# Patient Record
Sex: MALE | Race: WHITE
[De-identification: names, ages, dates, MRNs, and addresses within clinical notes are randomized per-mention and may not be internally consistent; named-entity substitution may affect disease eponyms.]

---

## 2020-10-30 ENCOUNTER — TRANSCRIPTION ENCOUNTER (OUTPATIENT)
Age: 65
End: 2020-10-30

## 2020-10-31 ENCOUNTER — EMERGENCY (EMERGENCY)
Facility: HOSPITAL | Age: 65
LOS: 0 days | Discharge: HOME | End: 2020-10-31
Attending: EMERGENCY MEDICINE | Admitting: EMERGENCY MEDICINE
Payer: COMMERCIAL

## 2020-10-31 VITALS
OXYGEN SATURATION: 99 % | DIASTOLIC BLOOD PRESSURE: 66 MMHG | RESPIRATION RATE: 18 BRPM | HEIGHT: 67 IN | WEIGHT: 160.06 LBS | HEART RATE: 79 BPM | SYSTOLIC BLOOD PRESSURE: 142 MMHG | TEMPERATURE: 97 F

## 2020-10-31 VITALS
SYSTOLIC BLOOD PRESSURE: 136 MMHG | RESPIRATION RATE: 18 BRPM | OXYGEN SATURATION: 99 % | DIASTOLIC BLOOD PRESSURE: 72 MMHG | TEMPERATURE: 97 F

## 2020-10-31 DIAGNOSIS — Z79.899 OTHER LONG TERM (CURRENT) DRUG THERAPY: ICD-10-CM

## 2020-10-31 DIAGNOSIS — M25.579 PAIN IN UNSPECIFIED ANKLE AND JOINTS OF UNSPECIFIED FOOT: ICD-10-CM

## 2020-10-31 DIAGNOSIS — Z88.0 ALLERGY STATUS TO PENICILLIN: ICD-10-CM

## 2020-10-31 DIAGNOSIS — E78.5 HYPERLIPIDEMIA, UNSPECIFIED: ICD-10-CM

## 2020-10-31 DIAGNOSIS — I10 ESSENTIAL (PRIMARY) HYPERTENSION: ICD-10-CM

## 2020-10-31 DIAGNOSIS — L03.115 CELLULITIS OF RIGHT LOWER LIMB: ICD-10-CM

## 2020-10-31 LAB
ALBUMIN SERPL ELPH-MCNC: 3.9 G/DL — SIGNIFICANT CHANGE UP (ref 3.5–5.2)
ALP SERPL-CCNC: 116 U/L — HIGH (ref 30–115)
ALT FLD-CCNC: 32 U/L — SIGNIFICANT CHANGE UP (ref 0–41)
ANION GAP SERPL CALC-SCNC: 11 MMOL/L — SIGNIFICANT CHANGE UP (ref 7–14)
AST SERPL-CCNC: 36 U/L — SIGNIFICANT CHANGE UP (ref 0–41)
BASOPHILS # BLD AUTO: 0.04 K/UL — SIGNIFICANT CHANGE UP (ref 0–0.2)
BASOPHILS NFR BLD AUTO: 0.5 % — SIGNIFICANT CHANGE UP (ref 0–1)
BILIRUB SERPL-MCNC: 0.7 MG/DL — SIGNIFICANT CHANGE UP (ref 0.2–1.2)
BUN SERPL-MCNC: 19 MG/DL — SIGNIFICANT CHANGE UP (ref 10–20)
CALCIUM SERPL-MCNC: 8.9 MG/DL — SIGNIFICANT CHANGE UP (ref 8.5–10.1)
CHLORIDE SERPL-SCNC: 96 MMOL/L — LOW (ref 98–110)
CO2 SERPL-SCNC: 28 MMOL/L — SIGNIFICANT CHANGE UP (ref 17–32)
CREAT SERPL-MCNC: 1.3 MG/DL — SIGNIFICANT CHANGE UP (ref 0.7–1.5)
EOSINOPHIL # BLD AUTO: 0.07 K/UL — SIGNIFICANT CHANGE UP (ref 0–0.7)
EOSINOPHIL NFR BLD AUTO: 0.8 % — SIGNIFICANT CHANGE UP (ref 0–8)
ERYTHROCYTE [SEDIMENTATION RATE] IN BLOOD: 45 MM/HR — HIGH (ref 0–10)
GLUCOSE SERPL-MCNC: 121 MG/DL — HIGH (ref 70–99)
HCT VFR BLD CALC: 46.4 % — SIGNIFICANT CHANGE UP (ref 42–52)
HGB BLD-MCNC: 15.6 G/DL — SIGNIFICANT CHANGE UP (ref 14–18)
IMM GRANULOCYTES NFR BLD AUTO: 0.4 % — HIGH (ref 0.1–0.3)
LACTATE SERPL-SCNC: 1.9 MMOL/L — SIGNIFICANT CHANGE UP (ref 0.7–2)
LYMPHOCYTES # BLD AUTO: 1.72 K/UL — SIGNIFICANT CHANGE UP (ref 1.2–3.4)
LYMPHOCYTES # BLD AUTO: 20.2 % — LOW (ref 20.5–51.1)
MCHC RBC-ENTMCNC: 29.9 PG — SIGNIFICANT CHANGE UP (ref 27–31)
MCHC RBC-ENTMCNC: 33.6 G/DL — SIGNIFICANT CHANGE UP (ref 32–37)
MCV RBC AUTO: 88.9 FL — SIGNIFICANT CHANGE UP (ref 80–94)
MONOCYTES # BLD AUTO: 1.11 K/UL — HIGH (ref 0.1–0.6)
MONOCYTES NFR BLD AUTO: 13 % — HIGH (ref 1.7–9.3)
NEUTROPHILS # BLD AUTO: 5.55 K/UL — SIGNIFICANT CHANGE UP (ref 1.4–6.5)
NEUTROPHILS NFR BLD AUTO: 65.1 % — SIGNIFICANT CHANGE UP (ref 42.2–75.2)
NRBC # BLD: 0 /100 WBCS — SIGNIFICANT CHANGE UP (ref 0–0)
PLATELET # BLD AUTO: 166 K/UL — SIGNIFICANT CHANGE UP (ref 130–400)
POTASSIUM SERPL-MCNC: 3.8 MMOL/L — SIGNIFICANT CHANGE UP (ref 3.5–5)
POTASSIUM SERPL-SCNC: 3.8 MMOL/L — SIGNIFICANT CHANGE UP (ref 3.5–5)
PROT SERPL-MCNC: 6.6 G/DL — SIGNIFICANT CHANGE UP (ref 6–8)
RBC # BLD: 5.22 M/UL — SIGNIFICANT CHANGE UP (ref 4.7–6.1)
RBC # FLD: 13 % — SIGNIFICANT CHANGE UP (ref 11.5–14.5)
SODIUM SERPL-SCNC: 135 MMOL/L — SIGNIFICANT CHANGE UP (ref 135–146)
WBC # BLD: 8.52 K/UL — SIGNIFICANT CHANGE UP (ref 4.8–10.8)
WBC # FLD AUTO: 8.52 K/UL — SIGNIFICANT CHANGE UP (ref 4.8–10.8)

## 2020-10-31 PROCEDURE — 99284 EMERGENCY DEPT VISIT MOD MDM: CPT

## 2020-10-31 PROCEDURE — 73610 X-RAY EXAM OF ANKLE: CPT | Mod: 26,RT

## 2020-10-31 RX ORDER — CEPHALEXIN 500 MG
1 CAPSULE ORAL
Qty: 40 | Refills: 0
Start: 2020-10-31 | End: 2020-11-09

## 2020-10-31 RX ORDER — CEPHALEXIN 500 MG
500 CAPSULE ORAL ONCE
Refills: 0 | Status: COMPLETED | OUTPATIENT
Start: 2020-10-31 | End: 2020-10-31

## 2020-10-31 RX ADMIN — Medication 500 MILLIGRAM(S): at 15:37

## 2020-10-31 NOTE — ED PROVIDER NOTE - CLINICAL SUMMARY MEDICAL DECISION MAKING FREE TEXT BOX
Pt with no significant PMHx presents for evaluation of redness and swelling to his right ankle. Pt states he woke up 3 days ago and noticed a small area of redness and swelling to the lateral aspect of his right ankle. The redness and swelling got progressively worse so he went to [facility] where he was prescribed and started a course of Clindamycin. He comes to the ED after he was told at [facility] to f/u for further eval. Denies fever, chills, n/v/d, numbness, weakness and tingling. On exam: Constitutional: Well appearing. No acute distress. Non toxic. Eyes: PERRLA. Extraocular movements intact, no entrapment. Conjunctiva normal. ENT: No nasal discharge. Neck: Supple, non tender, full range of motion.  Ext: Warm and well perfused x4, moving all extremities, no edema. Psy: Cooperative, appropriate. Skin: (+) warmth, swelling and erythema to lateral aspect of R ankle, dry skin, no signs of streaking. No crepitus, induration, fluctuance. Neuro: CN2-12 grossly intact, no sensory or motor deficits throughout, no drift, no ataxia. pt only started taking clinda yesterday. extensive discussion had with pt, aware that sx will get worse before improvement with <48hrs of abx. adde on keflex for staph/strep coverage. Aware of all results, given a copy of all available results, comfortable with discharge and follow-up outpatient, strict return precautions given. Endorses understanding of all of this and aware that they can return at any time for new or concerning symptoms. No further questions or concerns at this time

## 2020-10-31 NOTE — ED PROVIDER NOTE - PROGRESS NOTE DETAILS
ATTENDING NOTE: Pt with no significant PMHx presents for evaluation of redness and swelling to his right ankle. Pt states he woke up 3 days ago and noticed a small area of redness and swelling to the lateral aspect of his right ankle. The redness and swelling got progressively worse so he went to [facility] where he was prescribed and started a course of Clindamycin. He comes to the ED after he was told at [facility] to f/u for further eval. Denies fever, chills, n/v/d, numbness, weakness and tingling. On exam: Constitutional: Well appearing. No acute distress. Non toxic. Eyes: PERRLA. Extraocular movements intact, no entrapment. Conjunctiva normal. ENT: No nasal discharge. Neck: Supple, non tender, full range of motion. CV: RRR no murmurs, rubs, or gallops. +S1S2. Pulm: Clear to auscultation bilaterally. Normal work of breathing. Abd: soft NT ND +BS. Ext: Warm and well perfused x4, moving all extremities, no edema. Psy: Cooperative, appropriate. Skin: (+) warmth, swelling and erythema to lateral aspect of R ankle, dry skin, no signs of streaking. Neuro: CN2-12 grossly intact, no sensory or motor deficits throughout, no drift, no ataxia.

## 2020-10-31 NOTE — ED PROVIDER NOTE - PATIENT PORTAL LINK FT
You can access the FollowMyHealth Patient Portal offered by [facility] by registering at the following website: [email or web address]. By joining [employer]’s FollowMyHealth portal, you will also be able to view your health information using other applications (apps) compatible with our system.

## 2020-10-31 NOTE — ED PROVIDER NOTE - OBJECTIVE STATEMENT
64 yo male, pmh of htn and hld, presents to ed for right ankle redness. Started 3 days ago, went to [facility] [place], started on clinda, came to ed for further eval, states has not worsened over last day, mild, no specific pain or radiation. Denies trauma, fever, chills, numbness, tingling, limited rom.

## 2022-09-15 ENCOUNTER — NON-APPOINTMENT (OUTPATIENT)
Age: 67
End: 2022-09-15

## 2022-09-20 PROBLEM — E78.00 PURE HYPERCHOLESTEROLEMIA, UNSPECIFIED: Chronic | Status: ACTIVE | Noted: 2020-10-31

## 2022-09-20 PROBLEM — I10 ESSENTIAL (PRIMARY) HYPERTENSION: Chronic | Status: ACTIVE | Noted: 2020-10-31

## 2022-09-21 ENCOUNTER — APPOINTMENT (OUTPATIENT)
Dept: ORTHOPEDIC SURGERY | Facility: CLINIC | Age: 67
End: 2022-09-21

## 2022-09-21 VITALS — WEIGHT: 160 LBS | BODY MASS INDEX: 25.11 KG/M2 | HEIGHT: 67 IN

## 2022-09-21 DIAGNOSIS — Z78.9 OTHER SPECIFIED HEALTH STATUS: ICD-10-CM

## 2022-09-21 PROBLEM — Z00.00 ENCOUNTER FOR PREVENTIVE HEALTH EXAMINATION: Status: ACTIVE | Noted: 2022-09-21

## 2022-09-21 PROCEDURE — 99203 OFFICE O/P NEW LOW 30 MIN: CPT

## 2022-09-21 PROCEDURE — 73140 X-RAY EXAM OF FINGER(S): CPT | Mod: RT

## 2022-09-21 RX ORDER — LOSARTAN POTASSIUM 100 MG/1
100 TABLET, FILM COATED ORAL
Refills: 0 | Status: ACTIVE | COMMUNITY

## 2022-09-21 RX ORDER — ATORVASTATIN CALCIUM 10 MG/1
10 TABLET, FILM COATED ORAL
Refills: 0 | Status: ACTIVE | COMMUNITY

## 2022-09-21 NOTE — DISCUSSION/SUMMARY
[de-identified] : Impression:   Mallet finger of the right middle finger.\par \par   Plan:  Use fist Stax plane, this splint was placed in the office, pathology instructed how to use the splint and had to care for the finger.  \par Patient was advised that  he needs to be in extension for at least 5-6 weeks.  \par Patient was advised for repeat evaluation in about 2-3 weeks.  \par \par \par Follow-up: 2-3 weeks with Dr. Erick Restrepo

## 2022-09-21 NOTE — IMAGING
[de-identified] : On examination of the right middle finger, patient has some erythema over the PIP joint dorsally.  \par Patient has extension lag of the PIP.  \par  passively PIP joint can be extended.  \par Range of motion to the MCP and PIP of the middle finger.  \par Neurovascular intact.  \par \par \par   X-ray of the right middle finger was done in office today showing no acute fracture dislocation except for extension lag over the DIP joint.  \par

## 2022-09-21 NOTE — HISTORY OF PRESENT ILLNESS
[de-identified] :   Patient is a 67  year old male here for an evaluation of injury sustained to the right middle finger.  \par Patient states that on September 14, 2022 he was helping his wide to put some covers on to the couch when he believes he jammed his finger, since then he noticed that his finger is crooked , patient denies any pain.  \par \par

## 2022-10-12 ENCOUNTER — APPOINTMENT (OUTPATIENT)
Dept: ORTHOPEDIC SURGERY | Facility: CLINIC | Age: 67
End: 2022-10-12

## 2022-10-12 PROCEDURE — 99214 OFFICE O/P EST MOD 30 MIN: CPT

## 2022-10-12 NOTE — ASSESSMENT
[FreeTextEntry1] : Patient comes in after having a mallet injury to the right long finger.  He has been wearing a splint for 3 weeks.  He is doing relatively well.  Injured himself when he was putting questions on a couch.  He does not have other complaints today.\par \par   Right long finger:  nontender rotation over the distal phalanx, 5 degree extensor lag, neurovascularly intact\par \par The pathology was discussed with the patient at length including the use of diagrams drawn in the office.  We discussed that the recommended treatment is closed treatment with full time splinting for 6weeks.  During those 6 weeks the splint cannot come off- if the finger tip bends for even a fraction of a second the healing tissue tears and the 6 week period would start all over again.  The splint must remain clean and dry and needs to be well covered in the shower.  The patient verbalized understanding of the need for full time 24/7 splinting.  We also discussed the possibility of pin placement surgically to allow a splint to come on and off, although it must be worn except for hygiene to protect the pin and prevent bending of the pin.  We discussed that the only true indication for surgery is an incongruent joint.   We discussed that there were 4 possibilities: a perfect finger, a stiff finger, a bent finger or a dorsal bump over the tendon attachment site.  There could be combinations of stiff, bent and bumpy finger. We discussed that after 6 weeks of full time splinting I recommend 6 weeks of nighttime splinting.  The patient understands that splinting may lead to skin breakdown under the splint.  There is also a possibility of surgery in the future up to and including fusion.  The patient agrees to recommended plan of splinting.\par  patient is going to continue splinting for another 3 weeks follow-up with Kaye and then start nighttime splinting.\par

## 2022-11-02 ENCOUNTER — APPOINTMENT (OUTPATIENT)
Dept: ORTHOPEDIC SURGERY | Facility: CLINIC | Age: 67
End: 2022-11-02

## 2022-11-02 VITALS — BODY MASS INDEX: 25.11 KG/M2 | HEIGHT: 67 IN | WEIGHT: 160 LBS

## 2022-11-02 DIAGNOSIS — M20.019 MALLET FINGER OF UNSPECIFIED FINGER(S): ICD-10-CM

## 2022-11-02 PROCEDURE — 99213 OFFICE O/P EST LOW 20 MIN: CPT

## 2022-11-02 NOTE — HISTORY OF PRESENT ILLNESS
[de-identified] : Patient is a 67-year-old male here for repeat evaluation of his right middle finger.  He is about 6 weeks status post a mallet finger.  He is doing well.  He has been compliant with the splint.

## 2022-11-02 NOTE — DISCUSSION/SUMMARY
[de-identified] :   He is 6 weeks status post the injury.\par He is now going to transition to nighttime splinting only.  He may remove the splint during the day.\par He will continue nighttime splinting for a total of 6 weeks.\par He will follow up on as-needed basis.\par All questions were answered today.

## 2022-11-02 NOTE — PHYSICAL EXAM
[de-identified] :   Physical exam of his right long finger:  Negative swelling or ecchymosis.  There is mild maceration skin secondary to the splint.  He can flex and extend the MCP, PIP, and DIP joint of all digits.  Sensory and motor are intact.  No extensor lag of the DIP joint.

## 2023-07-29 ENCOUNTER — NON-APPOINTMENT (OUTPATIENT)
Age: 68
End: 2023-07-29

## 2024-06-03 ENCOUNTER — TRANSCRIPTION ENCOUNTER (OUTPATIENT)
Age: 69
End: 2024-06-03

## 2024-06-03 ENCOUNTER — RESULT REVIEW (OUTPATIENT)
Age: 69
End: 2024-06-03

## 2024-06-03 ENCOUNTER — OUTPATIENT (OUTPATIENT)
Dept: OUTPATIENT SERVICES | Facility: HOSPITAL | Age: 69
LOS: 1 days | Discharge: HOME | End: 2024-06-03
Payer: MEDICARE

## 2024-06-03 VITALS
RESPIRATION RATE: 18 BRPM | SYSTOLIC BLOOD PRESSURE: 158 MMHG | WEIGHT: 149.03 LBS | HEART RATE: 54 BPM | DIASTOLIC BLOOD PRESSURE: 77 MMHG | HEIGHT: 67 IN | OXYGEN SATURATION: 100 % | TEMPERATURE: 97 F

## 2024-06-03 VITALS
RESPIRATION RATE: 17 BRPM | DIASTOLIC BLOOD PRESSURE: 77 MMHG | HEART RATE: 62 BPM | OXYGEN SATURATION: 99 % | SYSTOLIC BLOOD PRESSURE: 138 MMHG

## 2024-06-03 DIAGNOSIS — R93.3 ABNORMAL FINDINGS ON DIAGNOSTIC IMAGING OF OTHER PARTS OF DIGESTIVE TRACT: ICD-10-CM

## 2024-06-03 PROCEDURE — 88305 TISSUE EXAM BY PATHOLOGIST: CPT

## 2024-06-03 PROCEDURE — 43239 EGD BIOPSY SINGLE/MULTIPLE: CPT

## 2024-06-03 PROCEDURE — 88305 TISSUE EXAM BY PATHOLOGIST: CPT | Mod: 26

## 2024-06-03 PROCEDURE — 43237 ENDOSCOPIC US EXAM ESOPH: CPT

## 2024-06-03 PROCEDURE — 88312 SPECIAL STAINS GROUP 1: CPT | Mod: 26

## 2024-06-03 PROCEDURE — 88312 SPECIAL STAINS GROUP 1: CPT

## 2024-06-03 RX ORDER — LOSARTAN POTASSIUM 100 MG/1
0 TABLET, FILM COATED ORAL
Qty: 0 | Refills: 0 | DISCHARGE

## 2024-06-03 RX ORDER — VERAPAMIL HCL 240 MG
0 CAPSULE, EXTENDED RELEASE PELLETS 24 HR ORAL
Qty: 0 | Refills: 0 | DISCHARGE

## 2024-06-03 RX ORDER — ATORVASTATIN CALCIUM 80 MG/1
1 TABLET, FILM COATED ORAL
Refills: 0 | DISCHARGE

## 2024-06-03 NOTE — PRE-ANESTHESIA EVALUATION ADULT - NSANTHOSAYNRD_GEN_A_CORE
No. ABDIFATAH screening performed.  STOP BANG Legend: 0-2 = LOW Risk; 3-4 = INTERMEDIATE Risk; 5-8 = HIGH Risk

## 2024-06-03 NOTE — ASU DISCHARGE PLAN (ADULT/PEDIATRIC) - NS MD DC FALL RISK RISK
For information on Fall & Injury Prevention, visit: [email or web address] OR  [email or web address] OR  https://www.cdc.gov/steadi/patient.html

## 2024-06-03 NOTE — ASU DISCHARGE PLAN (ADULT/PEDIATRIC) - CARE PROVIDER_API CALL
Abe Avila  Gastroenterology  [address] [place] [address]  Phone: [phone]  Fax: [phone]  Follow Up Time:

## 2024-06-03 NOTE — ASU PATIENT PROFILE, ADULT - FALL HARM RISK - UNIVERSAL INTERVENTIONS
Bed in lowest position, wheels locked, appropriate side rails in place/Call bell, personal items and telephone in reach/Instruct patient to call for assistance before getting out of bed or chair/Non-slip footwear when patient is out of bed/[place] to call system/Physically safe environment - no spills, clutter or unnecessary equipment/Purposeful Proactive Rounding/Room/bathroom lighting operational, light cord in reach

## 2024-06-05 LAB — SURGICAL PATHOLOGY STUDY: SIGNIFICANT CHANGE UP

## 2024-06-11 DIAGNOSIS — K86.2 CYST OF PANCREAS: ICD-10-CM

## 2024-06-11 DIAGNOSIS — E78.00 PURE HYPERCHOLESTEROLEMIA, UNSPECIFIED: ICD-10-CM

## 2024-06-11 DIAGNOSIS — I10 ESSENTIAL (PRIMARY) HYPERTENSION: ICD-10-CM

## 2024-06-11 DIAGNOSIS — Z88.0 ALLERGY STATUS TO PENICILLIN: ICD-10-CM

## 2024-06-11 DIAGNOSIS — R93.3 ABNORMAL FINDINGS ON DIAGNOSTIC IMAGING OF OTHER PARTS OF DIGESTIVE TRACT: ICD-10-CM

## 2024-06-11 DIAGNOSIS — K29.50 UNSPECIFIED CHRONIC GASTRITIS WITHOUT BLEEDING: ICD-10-CM

## 2024-06-11 DIAGNOSIS — K31.89 OTHER DISEASES OF STOMACH AND DUODENUM: ICD-10-CM

## 2024-11-01 ENCOUNTER — NON-APPOINTMENT (OUTPATIENT)
Age: 69
End: 2024-11-01

## 2025-01-24 ENCOUNTER — NON-APPOINTMENT (OUTPATIENT)
Age: 70
End: 2025-01-24